# Patient Record
Sex: FEMALE | URBAN - METROPOLITAN AREA
[De-identification: names, ages, dates, MRNs, and addresses within clinical notes are randomized per-mention and may not be internally consistent; named-entity substitution may affect disease eponyms.]

---

## 2019-09-25 ENCOUNTER — APPOINTMENT (RX ONLY)
Dept: URBAN - METROPOLITAN AREA CLINIC 15 | Facility: CLINIC | Age: 74
Setting detail: DERMATOLOGY
End: 2019-09-25

## 2019-09-25 DIAGNOSIS — L90.8 OTHER ATROPHIC DISORDERS OF SKIN: ICD-10-CM

## 2019-09-25 DIAGNOSIS — L28.0 LICHEN SIMPLEX CHRONICUS: ICD-10-CM

## 2019-09-25 DIAGNOSIS — L81.0 POSTINFLAMMATORY HYPERPIGMENTATION: ICD-10-CM

## 2019-09-25 DIAGNOSIS — L70.8 OTHER ACNE: ICD-10-CM

## 2019-09-25 DIAGNOSIS — L72.0 EPIDERMAL CYST: ICD-10-CM

## 2019-09-25 DIAGNOSIS — L21.8 OTHER SEBORRHEIC DERMATITIS: ICD-10-CM

## 2019-09-25 PROBLEM — F32.9 MAJOR DEPRESSIVE DISORDER, SINGLE EPISODE, UNSPECIFIED: Status: ACTIVE | Noted: 2019-09-25

## 2019-09-25 PROBLEM — J45.909 UNSPECIFIED ASTHMA, UNCOMPLICATED: Status: ACTIVE | Noted: 2019-09-25

## 2019-09-25 PROBLEM — E78.5 HYPERLIPIDEMIA, UNSPECIFIED: Status: ACTIVE | Noted: 2019-09-25

## 2019-09-25 PROBLEM — F41.9 ANXIETY DISORDER, UNSPECIFIED: Status: ACTIVE | Noted: 2019-09-25

## 2019-09-25 PROCEDURE — ? INTRALESIONAL KENALOG

## 2019-09-25 PROCEDURE — ? HYDRAFACIAL

## 2019-09-25 PROCEDURE — ? TREATMENT REGIMEN

## 2019-09-25 PROCEDURE — 99203 OFFICE O/P NEW LOW 30 MIN: CPT | Mod: 25

## 2019-09-25 PROCEDURE — ? PRESCRIPTION

## 2019-09-25 PROCEDURE — ? COUNSELING

## 2019-09-25 PROCEDURE — 11900 INJECT SKIN LESIONS </W 7: CPT

## 2019-09-25 RX ORDER — CLOBETASOL PROPIONATE 0.46 MG/ML
SOLUTION TOPICAL
Qty: 1 | Refills: 2 | COMMUNITY
Start: 2019-09-25

## 2019-09-25 RX ORDER — HALOBETASOL PROPIONATE AND TAZAROTENE .1; .45 MG/G; MG/G
LOTION TOPICAL
Qty: 1 | Refills: 0 | Status: ERX | COMMUNITY
Start: 2019-09-25

## 2019-09-25 RX ADMIN — HALOBETASOL PROPIONATE AND TAZAROTENE 1: .1; .45 LOTION TOPICAL at 00:00

## 2019-09-25 RX ADMIN — CLOBETASOL PROPIONATE 1: 0.46 SOLUTION TOPICAL at 00:00

## 2019-09-25 ASSESSMENT — LOCATION SIMPLE DESCRIPTION DERM
LOCATION SIMPLE: RIGHT ANKLE
LOCATION SIMPLE: NOSE
LOCATION SIMPLE: RIGHT CHEEK
LOCATION SIMPLE: RIGHT CHEEK
LOCATION SIMPLE: LEFT CHEEK
LOCATION SIMPLE: NOSE
LOCATION SIMPLE: LEFT ANKLE
LOCATION SIMPLE: LEFT CHEEK

## 2019-09-25 ASSESSMENT — LOCATION DETAILED DESCRIPTION DERM
LOCATION DETAILED: LEFT CENTRAL MALAR CHEEK
LOCATION DETAILED: LEFT ANKLE
LOCATION DETAILED: RIGHT LATERAL BUCCAL CHEEK
LOCATION DETAILED: RIGHT CENTRAL BUCCAL CHEEK
LOCATION DETAILED: RIGHT ANKLE
LOCATION DETAILED: NASAL DORSUM
LOCATION DETAILED: LEFT SUPERIOR LATERAL BUCCAL CHEEK
LOCATION DETAILED: RIGHT CENTRAL MALAR CHEEK
LOCATION DETAILED: NASAL INFRATIP
LOCATION DETAILED: LEFT CENTRAL BUCCAL CHEEK

## 2019-09-25 ASSESSMENT — LOCATION ZONE DERM
LOCATION ZONE: FACE
LOCATION ZONE: LEG
LOCATION ZONE: NOSE
LOCATION ZONE: NOSE
LOCATION ZONE: FACE

## 2019-09-25 NOTE — PROCEDURE: HYDRAFACIAL
Additional Vacuum Pressure (Won't Render If 0): 0
Post-Care Instructions: I reviewed with the patient in detail post-care instructions. Patient should stay away from the sun and wear sun protection until treated areas are fully healed.
Detail Level: Zone
Indication: pigmentation abnormalities
Price (Use Numbers Only, No Special Characters Or $): 0.00
Treatment Number: 1
Vacuum Pressure: [address]
Consent: Written consent obtained, risks reviewed including but not limited to crusting, scabbing, blistering, scarring, darker or lighter pigmentary change, bruising, and/or incomplete response.
Number Of Passes: 2
Glycolic Acid %: 7.5%

## 2019-09-25 NOTE — PROCEDURE: INTRALESIONAL KENALOG
Concentration Of Solution Injected (Mg/Ml): 2.5
X Size Of Lesion In Cm (Optional): 0
Ndc# For Kenalog Only: 8519-8109-95
Administered By (Optional): Marine Du MD
Medical Necessity Clause: This procedure was medically necessary because the lesions that were treated were:
Detail Level: Detailed
Total Volume Injected (Ccs- Only Use Numbers And Decimals): 0.6
Lot # (Optional): [id number]
Include Z78.9 (Other Specified Conditions Influencing Health Status) As An Associated Diagnosis?: No
Consent: The risks of atrophy were reviewed with the patient.
Kenalog Preparation: Kenalog
Treatment Number (Optional): 1
Expiration Date (Optional): October 2020

## 2019-09-25 NOTE — PROCEDURE: TREATMENT REGIMEN
Detail Level: Zone
Otc Regimen: Cerave SA cream twice a day
Plan: Hydra facial recommended
Initiate Treatment: Morning\\nWash face with cleanser\\nSkin Brightening 6% pads\\nSkin Better Even tone\\nSkin Better Interfuse face\\nNeck Rejuvenation\\nAlastin Hydratint in the morning \\n\\nNight\\nWash face with cleanser\\nSkin Brightening 6% pads\\nSkin Better AlphaRet \\nSkin Better Even tone\\nSkin Better Interfuse Face

## 2019-09-27 ENCOUNTER — RX ONLY (OUTPATIENT)
Age: 74
Setting detail: RX ONLY
End: 2019-09-27

## 2019-09-27 RX ORDER — HALOBETASOL PROPIONATE AND TAZAROTENE .1; .45 MG/G; MG/G
LOTION TOPICAL
Qty: 1 | Refills: 0 | Status: ERX

## 2019-09-27 RX ORDER — CLOBETASOL PROPIONATE 0.46 MG/ML
SOLUTION TOPICAL
Qty: 1 | Refills: 2 | Status: ERX

## 2019-10-01 ENCOUNTER — RX ONLY (OUTPATIENT)
Age: 74
Setting detail: RX ONLY
End: 2019-10-01

## 2019-10-01 RX ORDER — MOMETASONE FUROATE 1 MG/G
OINTMENT TOPICAL
Qty: 1 | Refills: 1 | Status: ERX | COMMUNITY
Start: 2019-10-01

## 2019-11-06 ENCOUNTER — APPOINTMENT (RX ONLY)
Dept: URBAN - METROPOLITAN AREA CLINIC 15 | Facility: CLINIC | Age: 74
Setting detail: DERMATOLOGY
End: 2019-11-06

## 2019-11-06 DIAGNOSIS — L72.8 OTHER FOLLICULAR CYSTS OF THE SKIN AND SUBCUTANEOUS TISSUE: ICD-10-CM

## 2019-11-06 DIAGNOSIS — L81.1 CHLOASMA: ICD-10-CM

## 2019-11-06 DIAGNOSIS — L28.0 LICHEN SIMPLEX CHRONICUS: ICD-10-CM

## 2019-11-06 DIAGNOSIS — L28.1 PRURIGO NODULARIS: ICD-10-CM

## 2019-11-06 DIAGNOSIS — D22 MELANOCYTIC NEVI: ICD-10-CM

## 2019-11-06 PROBLEM — D48.5 NEOPLASM OF UNCERTAIN BEHAVIOR OF SKIN: Status: ACTIVE | Noted: 2019-11-06

## 2019-11-06 PROCEDURE — ? BIOPSY BY PUNCH METHOD

## 2019-11-06 PROCEDURE — 11900 INJECT SKIN LESIONS </W 7: CPT

## 2019-11-06 PROCEDURE — ? COUNSELING

## 2019-11-06 PROCEDURE — ? INTRALESIONAL KENALOG

## 2019-11-06 PROCEDURE — ? TREATMENT REGIMEN

## 2019-11-06 PROCEDURE — 11104 PUNCH BX SKIN SINGLE LESION: CPT

## 2019-11-06 PROCEDURE — 99214 OFFICE O/P EST MOD 30 MIN: CPT | Mod: 25

## 2019-11-06 ASSESSMENT — LOCATION SIMPLE DESCRIPTION DERM
LOCATION SIMPLE: RIGHT TEMPLE
LOCATION SIMPLE: LEFT PRETIBIAL REGION
LOCATION SIMPLE: RIGHT PRETIBIAL REGION
LOCATION SIMPLE: LEFT LIP

## 2019-11-06 ASSESSMENT — LOCATION ZONE DERM
LOCATION ZONE: LIP
LOCATION ZONE: LEG
LOCATION ZONE: FACE

## 2019-11-06 ASSESSMENT — LOCATION DETAILED DESCRIPTION DERM
LOCATION DETAILED: LEFT LOWER CUTANEOUS LIP
LOCATION DETAILED: LEFT DISTAL PRETIBIAL REGION
LOCATION DETAILED: RIGHT CENTRAL TEMPLE
LOCATION DETAILED: RIGHT DISTAL PRETIBIAL REGION

## 2019-11-06 NOTE — PROCEDURE: INTRALESIONAL KENALOG
Kenalog Preparation: Kenalog
Detail Level: Detailed
Total Volume Injected (Ccs- Only Use Numbers And Decimals): 0.3
Lot # (Optional): [id number]
Ndc# For Kenalog Only: 6666-8339-82
Medical Necessity Clause: This procedure was medically necessary because the lesions that were treated were:
Consent: The risks of atrophy were reviewed with the patient.
X Size Of Lesion In Cm (Optional): 0
Concentration Of Solution Injected (Mg/Ml): 2.5
Administered By (Optional): Marshall Evans MD
Include Z78.9 (Other Specified Conditions Influencing Health Status) As An Associated Diagnosis?: No
Expiration Date (Optional): IBE0333
Expiration Date (Optional): OCT 2020
Lot # (Optional): [id number]
Ndc# For Kenalog Only: 6278-5238-39
Administered By (Optional): Tierney Mancilla MD

## 2019-11-06 NOTE — PROCEDURE: BIOPSY BY PUNCH METHOD
Suture Removal: 7 days
Biopsy Type: H and E
Anesthesia Volume In Cc (Will Not Render If 0): 0.5
Size Of Lesion In Cm (Optional): 0
Post-Care Instructions: I reviewed with the patient in detail post-care instructions. Patient is to keep the biopsy site dry overnight, and then apply bacitracin twice daily until healed. Patient may apply hydrogen peroxide soaks to remove any crusting.
Detail Level: Detailed
Consent: The provider's intent is to obtain a tissue sample solely for diagnostic purposes. Written consent to obtain tissue sample was obtained and risks were reviewed including but not limited to scarring, infection, bleeding, scabbing, incomplete removal, nerve damage and allergy to anesthesia.
Hemostasis: None
Wound Care: Bacitracin
Home Suture Removal Text: Patient was provided a home suture removal kit and will remove their sutures at home. If they have any questions or difficulties they will call the office.
Epidermal Sutures: 5-0 Monocryl
Punch Size In Mm: 3
Bill For Surgical Tray: no
Lab: [facility]
Was A Bandage Applied: Yes
Anesthesia Type: 1% lidocaine with epinephrine
Notification Instructions: Patient will be notified of biopsy results. However, patient instructed to call the office if not contacted within 2 weeks.
Lab Facility: [address]
Billing Type: [employer]
Dressing: bandage

## 2019-11-06 NOTE — PROCEDURE: TREATMENT REGIMEN
Continue Regimen: Duobrii lotion once a day
Detail Level: Zone
Initiate Treatment: Morning Wash face with cleanser\\nSkin Better Even tone\\nPigmented control cream  thin layer to face in the morning \\nSkin Better Interfuse face\\nNeck Rejuvenation\\nAlastin Hydratint in the morning\\n\\n\\nNight\\nWash face with cleanser \\nSkin Better Even tone\\nPigmented control cream\\nPigmented control & blending mixed with Skin Better AlphaRet\\nSkin Better Interfuse Face.

## 2019-11-13 ENCOUNTER — APPOINTMENT (RX ONLY)
Dept: URBAN - METROPOLITAN AREA CLINIC 15 | Facility: CLINIC | Age: 74
Setting detail: DERMATOLOGY
End: 2019-11-13

## 2019-11-13 DIAGNOSIS — Z48.02 ENCOUNTER FOR REMOVAL OF SUTURES: ICD-10-CM

## 2019-11-13 PROCEDURE — 99024 POSTOP FOLLOW-UP VISIT: CPT

## 2019-11-13 PROCEDURE — ? POST-OP WOUND EVALUATION

## 2019-11-13 ASSESSMENT — LOCATION SIMPLE DESCRIPTION DERM: LOCATION SIMPLE: RIGHT TEMPLE

## 2019-11-13 ASSESSMENT — LOCATION ZONE DERM: LOCATION ZONE: FACE

## 2019-11-13 ASSESSMENT — LOCATION DETAILED DESCRIPTION DERM: LOCATION DETAILED: RIGHT CENTRAL TEMPLE

## 2019-11-13 NOTE — PROCEDURE: MIPS QUALITY
Quality 131: Pain Assessment And Follow-Up: Pain assessment using a standardized tool is documented as negative, no follow-up plan required
Quality 130: Documentation Of Current Medications In The Medical Record: Current Medications Documented
Additional Notes: Patient states painas negative, and on a scale of 0-10 the pain level is 0/10.
Detail Level: Detailed

## 2019-11-13 NOTE — PROCEDURE: POST-OP WOUND EVALUATION
Detail Level: Detailed
Wound Edema?: mild
Sutures?: intact
Wound Granulation?: early
Wound Diameter In Cm(Optional): 0
Wound Crusting?: clean
Wound Evaluated By (Optional): Gayla Bone MA, Christine Hoang MD
Wound Color?: pink
Add 78806 Cpt? (Important Note: In 2017 The Use Of 42365 Is Being Tracked By Cms To Determine Future Global Period Reimbursement For Global Periods): yes

## 2019-12-04 ENCOUNTER — APPOINTMENT (RX ONLY)
Dept: URBAN - METROPOLITAN AREA CLINIC 15 | Facility: CLINIC | Age: 74
Setting detail: DERMATOLOGY
End: 2019-12-04

## 2019-12-04 DIAGNOSIS — L81.1 CHLOASMA: ICD-10-CM | Status: IMPROVED

## 2019-12-04 DIAGNOSIS — L28.0 LICHEN SIMPLEX CHRONICUS: ICD-10-CM | Status: IMPROVED

## 2019-12-04 DIAGNOSIS — L72.8 OTHER FOLLICULAR CYSTS OF THE SKIN AND SUBCUTANEOUS TISSUE: ICD-10-CM

## 2019-12-04 PROCEDURE — ? COUNSELING

## 2019-12-04 PROCEDURE — 99214 OFFICE O/P EST MOD 30 MIN: CPT

## 2019-12-04 PROCEDURE — ? TREATMENT REGIMEN

## 2019-12-04 ASSESSMENT — LOCATION SIMPLE DESCRIPTION DERM
LOCATION SIMPLE: RIGHT CHEEK
LOCATION SIMPLE: LEFT CHEEK
LOCATION SIMPLE: LEFT LIP
LOCATION SIMPLE: RIGHT PRETIBIAL REGION

## 2019-12-04 ASSESSMENT — LOCATION ZONE DERM
LOCATION ZONE: LIP
LOCATION ZONE: LEG
LOCATION ZONE: FACE

## 2019-12-04 ASSESSMENT — LOCATION DETAILED DESCRIPTION DERM
LOCATION DETAILED: LEFT CENTRAL MANDIBULAR CHEEK
LOCATION DETAILED: RIGHT INFERIOR CENTRAL MALAR CHEEK
LOCATION DETAILED: LEFT INFERIOR CENTRAL MALAR CHEEK
LOCATION DETAILED: LEFT LOWER CUTANEOUS LIP
LOCATION DETAILED: RIGHT DISTAL PRETIBIAL REGION

## 2019-12-04 NOTE — PROCEDURE: TREATMENT REGIMEN
Continue Regimen: Morning Wash face with cleanser\\nSkin Better Even tone\\nPigmented control cream  thin layer to face in the morning \\nSkin Better Interfuse face\\nNeck Rejuvenation\\nAlastin Hydratint in the morning\\n\\n\\nNight\\nWash face with cleanser \\nSkin Better Even tone\\nPigmented control cream\\nPigmented control & blending mixed with Skin Better AlphaRet\\nSkin Better Interfuse Face.
Detail Level: Generalized

## 2019-12-04 NOTE — PROCEDURE: MIPS QUALITY
Additional Notes: Patient states pain as neagtive, and on a scale of 0-10 the pain level is 0/10.
Detail Level: Detailed
Quality 130: Documentation Of Current Medications In The Medical Record: Current Medications Documented
Quality 131: Pain Assessment And Follow-Up: Pain assessment using a standardized tool is documented as negative, no follow-up plan required

## 2019-12-04 NOTE — PROCEDURE: REASSURANCE
Detail Level: Detailed
Additional Notes (Optional): Discussed with patient that we do not recommended surgical removal since patient has history of Keloids
Hide Additional Notes?: No